# Patient Record
Sex: FEMALE | Race: OTHER
[De-identification: names, ages, dates, MRNs, and addresses within clinical notes are randomized per-mention and may not be internally consistent; named-entity substitution may affect disease eponyms.]

---

## 2017-02-28 ENCOUNTER — HOSPITAL ENCOUNTER (EMERGENCY)
Dept: HOSPITAL 10 - E/R | Age: 82
LOS: 1 days | Discharge: HOME | End: 2017-03-01
Payer: MEDICARE

## 2017-02-28 VITALS
BODY MASS INDEX: 20.97 KG/M2 | WEIGHT: 125.88 LBS | WEIGHT: 125.88 LBS | BODY MASS INDEX: 20.97 KG/M2 | HEIGHT: 65 IN | HEIGHT: 65 IN

## 2017-02-28 DIAGNOSIS — J45.901: Primary | ICD-10-CM

## 2017-02-28 LAB
ADD SCAN DIFF: NO
ADD UMIC: YES
ALBUMIN SERPL-MCNC: 4.1 G/DL (ref 3.3–4.9)
ALBUMIN/GLOB SERPL: 0.95 {RATIO}
ALP SERPL-CCNC: 119 IU/L (ref 42–121)
ALT SERPL-CCNC: 24 IU/L (ref 13–69)
ANION GAP SERPL CALC-SCNC: 16 MMOL/L (ref 8–16)
AST SERPL-CCNC: 32 IU/L (ref 15–46)
BASOPHILS # BLD AUTO: 0.1 10^3/UL (ref 0–0.1)
BASOPHILS NFR BLD: 0.7 % (ref 0–2)
BILIRUB DIRECT SERPL-MCNC: 0 MG/DL (ref 0–0.2)
BILIRUB SERPL-MCNC: 0.1 MG/DL (ref 0.2–1.3)
BUN SERPL-MCNC: 13 MG/DL (ref 7–20)
CALCIUM SERPL-MCNC: 9.5 MG/DL (ref 8.4–10.2)
CHLORIDE SERPL-SCNC: 103 MMOL/L (ref 97–110)
CO2 SERPL-SCNC: 29 MMOL/L (ref 21–31)
COLOR UR: (no result)
CREAT SERPL-MCNC: 0.62 MG/DL (ref 0.44–1)
EOSINOPHIL # BLD: 0.2 10^3/UL (ref 0–0.5)
EOSINOPHIL NFR BLD: 1.8 % (ref 0–7)
ERYTHROCYTE [DISTWIDTH] IN BLOOD BY AUTOMATED COUNT: 12.5 % (ref 11.5–14.5)
GLOBULIN SER-MCNC: 4.3 G/DL (ref 1.3–3.2)
GLUCOSE SERPL-MCNC: 92 MG/DL (ref 70–220)
GLUCOSE UR STRIP-MCNC: NEGATIVE %
HCT VFR BLD CALC: 40.8 % (ref 37–47)
HGB BLD-MCNC: 13.6 G/DL (ref 12–16)
KETONES UR STRIP.AUTO-MCNC: NEGATIVE MG/DL
LYMPHOCYTES # BLD AUTO: 2.5 10^3/UL (ref 0.8–2.9)
LYMPHOCYTES NFR BLD AUTO: 28.7 % (ref 15–51)
MCH RBC QN AUTO: 30.8 PG (ref 29–33)
MCHC RBC AUTO-ENTMCNC: 33.3 G/DL (ref 32–37)
MCV RBC AUTO: 92.5 FL (ref 82–101)
MONOCYTES # BLD: 0.8 10^3/UL (ref 0.3–0.9)
MONOCYTES NFR BLD: 8.5 % (ref 0–11)
NEUTROPHILS # BLD: 5.3 10^3/UL (ref 1.6–7.5)
NEUTROPHILS NFR BLD AUTO: 59.8 % (ref 39–77)
NITRITE UR QL STRIP.AUTO: NEGATIVE
NRBC # BLD MANUAL: 0 10^3/UL (ref 0–0)
NRBC BLD QL: 0 /100WBC (ref 0–0)
PLATELET # BLD: 231 10^3/UL (ref 140–415)
PMV BLD AUTO: 10.4 FL (ref 7.4–10.4)
POTASSIUM SERPL-SCNC: 4.1 MMOL/L (ref 3.5–5.1)
PROT SERPL-MCNC: 8.4 G/DL (ref 6.1–8.1)
RBC # BLD AUTO: 4.41 10^6/UL (ref 4.2–5.4)
RBC # UR AUTO: NEGATIVE /UL
RBC #/AREA URNS HPF: (no result) /HPF
SODIUM SERPL-SCNC: 144 MMOL/L (ref 135–144)
SQUAMOUS #/AREA URNS HPF: (no result) /[HPF]
URINE BILIRUBIN (DIP): NEGATIVE
URINE TOTAL PROTEIN (DIP): NEGATIVE
UROBILINOGEN UR STRIP-ACNC: (no result) (ref 0.1–1)
WBC # BLD AUTO: 8.8 10^3/UL (ref 4.8–10.8)
WBC # UR STRIP: (no result) /UL

## 2017-02-28 PROCEDURE — 96374 THER/PROPH/DIAG INJ IV PUSH: CPT

## 2017-02-28 PROCEDURE — 85025 COMPLETE CBC W/AUTO DIFF WBC: CPT

## 2017-02-28 PROCEDURE — 71010: CPT

## 2017-02-28 PROCEDURE — 81001 URINALYSIS AUTO W/SCOPE: CPT

## 2017-02-28 PROCEDURE — 96375 TX/PRO/DX INJ NEW DRUG ADDON: CPT

## 2017-02-28 PROCEDURE — 80053 COMPREHEN METABOLIC PANEL: CPT

## 2017-02-28 PROCEDURE — 99284 EMERGENCY DEPT VISIT MOD MDM: CPT

## 2017-02-28 PROCEDURE — 94664 DEMO&/EVAL PT USE INHALER: CPT

## 2017-02-28 PROCEDURE — 81003 URINALYSIS AUTO W/O SCOPE: CPT

## 2017-02-28 NOTE — RADRPT
PROCEDURE:   XR Chest. 

 

CLINICAL INDICATION:   Cough.   

 

TECHNIQUE:   Portable AP upright view of the chest was obtained. 

 

COMPARISON:   03/12/2016 

 

FINDINGS:

 

The cardiomediastinal silhouette is within upper normal limits.  Calcifications of the right lower h
emithorax are again noted possibly chronic granulomatous disease plaques, no acute infiltrate is pre
sent.  There is no evidence for pleural effusion, pneumothorax or pulmonary vascular congestion.  De
mineralization is again noted without evidence of acute osseous abnormality. Calcification is again 
visible within the aortic arch. 

 

RPTAT:HJJR

 

IMPRESSION:

 

1.  Stable calcifications of the right lower lobe region possibly chronic granulomatous disease or c
alcified pleural plaques.

2.  No evidence of pneumonia or new intrathoracic abnormality, the appearance of the chest unchanged
 from 03/12/2016 when allowing for technical differences.

3.  Aortic atherosclerosis is present.

_____________________________________________ 

Physician Deven           Date    Time 

Electronically viewed and signed by Physician Deven on 02/28/2017 22:59 

 

D:  02/28/2017 22:59  T:  02/28/2017 22:59

JR/

## 2017-03-01 VITALS — RESPIRATION RATE: 16 BRPM | SYSTOLIC BLOOD PRESSURE: 149 MMHG | DIASTOLIC BLOOD PRESSURE: 73 MMHG | HEART RATE: 81 BPM

## 2017-06-03 ENCOUNTER — HOSPITAL ENCOUNTER (EMERGENCY)
Dept: HOSPITAL 10 - FTE | Age: 82
Discharge: HOME | End: 2017-06-03
Payer: MEDICARE

## 2017-06-03 VITALS
WEIGHT: 126.77 LBS | BODY MASS INDEX: 23.33 KG/M2 | BODY MASS INDEX: 23.33 KG/M2 | WEIGHT: 126.77 LBS | HEIGHT: 62 IN | HEIGHT: 62 IN

## 2017-06-03 DIAGNOSIS — R05: Primary | ICD-10-CM

## 2017-06-03 PROCEDURE — 71010: CPT

## 2017-06-03 NOTE — ERD
ER Documentation


Chief Complaint


Date/Time


DATE: 6/3/17 


TIME: 19:10


Chief Complaint


cough





HPI


This 81-year-old female presents with cough for the last 2 weeks.  Her doctor 

did a PPD by history may have had a positive.  Chest x-ray result is pending.  

She is here because she complains of a cough with slight production.  She 

denies hemoptysis night sweats, chest pain, additional symptoms.





ROS


All systems reviewed and are negative except as per history of present illness.





Medications


Home Meds


Active Scripts


Acetaminophen* (Tylophen*) 500 Mg Capsule, 1 CAP PO Q6H Y for PAIN AND OR 

ELEVATED TEMP, #15 CAP


   Prov:SHERRILL OLIVEROS MD         6/3/17


Azithromycin* (Zithromax*) 250 Mg Tablet, 250 MG PO .ZPACK AS DIRECTED, #6 TAB


   TAKE 500 MG (2 TABS) THE FIRST DAY THEN 250 MG (1 TAB) DAYS 2-5


   Prov:SHERRILL OLIVEROS MD         6/3/17


Dextromethorphan Hb-Promethazine Hcl (Promethazine DM Syrup) 473 Ml Syrup, 5 ML 

PO Q6H Y for COUGH, #4 OZ


   Prov:SHERRILL OLIVEROS MD         6/3/17


Azithromycin* (Zithromax*) 250 Mg Tablet, 250 MG PO .ZPACK AS DIRECTED, #6 TAB


   TAKE 500 MG (2 TABS) THE FIRST DAY THEN 250 MG (1 TAB) DAYS 2-5


   Prov:ALESSIA GARCIA         3/1/17


Albuterol Sulfate* (Ventolin HFA*) 18 Gm Hfa.aer.ad, 2 PUFF INHALATION Q4H, #1 

INHALER


   Prov:ALESSIA GARCIA         3/1/17


Prednisone* (Prednisone*) 20 Mg Tab, 40 MG PO DAILY for 4 Days, TAB


   Prov:ALESSIA GARCIA         3/1/17





Allergies


Allergies:  


Coded Allergies:  


     No Known Allergy (Unverified , 2/28/17)





PMhx/Soc


Medical and Surgical Hx:  pt denies Medical Hx, pt denies Surgical Hx


History of Surgery:  No


Anesthesia Reaction:  No


Hx Neurological Disorder:  No


Hx Respiratory Disorders:  No


Hx Cardiac Disorders:  No


Hx Psychiatric Problems:  No


Hx Miscellaneous Medical Probl:  No


Hx Alcohol Use:  No


Hx Substance Use:  No


Hx Tobacco Use:  No


Smoking Status:  Never smoker





Physical Exam


Vitals





Vital Signs








  Date Time  Temp Pulse Resp B/P Pulse Ox O2 Delivery O2 Flow Rate FiO2


 


6/3/17 16:26 99.7 83 18 121/66 96   








Physical Exam


Const:  [] Alert, not ill-appearing.


Head:   Atraumatic 


Eyes:    Normal Conjunctiva


ENT:    Normal External Ears, Nose and Mouth.


Neck:               Full range of motion..~ No meningismus.


Resp:    Clear to auscultation bilaterally


Cardio:    Regular rate and rhythm, no murmurs


Abd:    Soft, non tender, non distended. Normal bowel sounds


Skin:    No petechiae or rashes


Back:    No midline or flank tenderness


Ext:    No cyanosis, or edema


Neur:    Awake and alert


Psych:    Normal Mood and Affect





Procedures/MDM


Chest X-ray 1V Interpreted by me:


Soft Tissue:                                               No acute 

abnormalities


Bones:                                                    No acute abnormalities


Mediastinum/Cardiac Silhouette/Lungs:     [No acute abnormalities].  Impression-

normal 1 view chest x-ray





Patient presents with a cough with x-ray with no signs of infiltrate, signs of 

hypoxemia, respiratory distress or active tuberculosis.  Patient was treated 

with Zithromax, promethazine and further observation at home.  The patient was 

stable with no new complaints during the ER course. Clinically, there is no 

current evidence to suggest meningitis, sepsis, acute abdomen, pneumonia, acute 

coronary syndrome, pulmonary embolism, or any other emergent condition 

appearing to require further evaluation or hospitalization. The patient should 

certainly return for any new or worsening symptoms per the aftercare 

instructions. They should otherwise follow-up with her primary care doctor for 

reevaluation this week.





Departure


Diagnosis:  


 Primary Impression:  


 Cough


Condition:  Stable


Patient Instructions:  Acute Bronchitis





Additional Instructions:  


X-ray normal.   Cheque otro vez con tucker doctor primario en el proximo ambriz or 

regresa para mas o nueva simptomas.











SHERRILL OLIVEROS MD Dorian 3, 2017 19:11

## 2017-06-03 NOTE — RADRPT
PROCEDURE:   XR Chest. 

 

CLINICAL INDICATION:   Cough.   

 

TECHNIQUE:   Portable AP upright view of the chest was obtained. 

 

COMPARISON:   02/28/2017 

 

FINDINGS:

 

The cardiomediastinal silhouette is within normal limits.  The lungs are clear of acute infiltrates.
  Basilar calcifications in the right lower lobe are less conspicuous than previously.  There is mannie
e flattening of the diaphragm which cannot exclude emphysema.  There is no evidence for pleural effu
ingrid, pneumothorax or pulmonary vascular congestion.  The osseous structures are intact with no evid
ence for acute abnormality. Calcification of the aorta is demonstrated. 

 

RPTAT:HJJR

 

IMPRESSION:

 

1.  Mild flattening of the diaphragm unable to exclude emphysema.

2.  Right lower lobe calcifications less conspicuous on the prior study possibly chronic granulomato
us disease or pleural plaques.

3.  No evidence of pulmonary infiltrate.

_____________________________________________ 

Physician Deven           Date    Time 

Electronically viewed and signed by Physician Deven on 06/03/2017 20:18 

 

D:  06/03/2017 20:18  T:  06/03/2017 20:18

/

## 2017-07-30 ENCOUNTER — HOSPITAL ENCOUNTER (EMERGENCY)
Dept: HOSPITAL 10 - E/R | Age: 82
Discharge: HOME | End: 2017-07-30
Payer: MEDICARE

## 2017-07-30 VITALS
WEIGHT: 173.94 LBS | HEIGHT: 63 IN | WEIGHT: 173.94 LBS | BODY MASS INDEX: 30.82 KG/M2 | HEIGHT: 63 IN | BODY MASS INDEX: 30.82 KG/M2

## 2017-07-30 VITALS — HEART RATE: 79 BPM | SYSTOLIC BLOOD PRESSURE: 116 MMHG | DIASTOLIC BLOOD PRESSURE: 78 MMHG | RESPIRATION RATE: 18 BRPM

## 2017-07-30 VITALS — TEMPERATURE: 98.3 F

## 2017-07-30 DIAGNOSIS — R10.84: ICD-10-CM

## 2017-07-30 DIAGNOSIS — R19.7: Primary | ICD-10-CM

## 2017-07-30 LAB
ADD UMIC: YES
ALBUMIN SERPL-MCNC: 4.5 G/DL (ref 3.3–4.9)
ALBUMIN/GLOB SERPL: 1.28 {RATIO}
ALP SERPL-CCNC: 79 IU/L (ref 42–121)
ALT SERPL-CCNC: 26 IU/L (ref 13–69)
AMYLASE SERPL-CCNC: 79 U/L (ref 11–123)
ANION GAP SERPL CALC-SCNC: 19 MMOL/L (ref 8–16)
APTT BLD: 29.3 SEC (ref 25–35)
AST SERPL-CCNC: 25 IU/L (ref 15–46)
BASOPHILS # BLD AUTO: 0 10^3/UL (ref 0–0.1)
BASOPHILS NFR BLD: 0.4 % (ref 0–2)
BILIRUB DIRECT SERPL-MCNC: 0 MG/DL (ref 0–0.2)
BILIRUB SERPL-MCNC: 0.2 MG/DL (ref 0.2–1.3)
BUN SERPL-MCNC: 11 MG/DL (ref 7–20)
CALCIUM SERPL-MCNC: 9.6 MG/DL (ref 8.4–10.2)
CHLORIDE SERPL-SCNC: 101 MMOL/L (ref 97–110)
CO2 SERPL-SCNC: 26 MMOL/L (ref 21–31)
COLOR UR: (no result)
CREAT SERPL-MCNC: 0.77 MG/DL (ref 0.44–1)
EOSINOPHIL # BLD: 0 10^3/UL (ref 0–0.5)
EOSINOPHIL NFR BLD: 0.1 % (ref 0–7)
ERYTHROCYTE [DISTWIDTH] IN BLOOD BY AUTOMATED COUNT: 12.5 % (ref 11.5–14.5)
GLOBULIN SER-MCNC: 3.5 G/DL (ref 1.3–3.2)
GLUCOSE SERPL-MCNC: 101 MG/DL (ref 70–220)
GLUCOSE UR STRIP-MCNC: NEGATIVE MG/DL
HCT VFR BLD CALC: 40.5 % (ref 37–47)
HGB BLD-MCNC: 14 G/DL (ref 12–16)
INR PPP: 0.93
KETONES UR STRIP.AUTO-MCNC: NEGATIVE MG/DL
LYMPHOCYTES # BLD AUTO: 1.7 10^3/UL (ref 0.8–2.9)
LYMPHOCYTES NFR BLD AUTO: 16.2 % (ref 15–51)
MCH RBC QN AUTO: 31.3 PG (ref 29–33)
MCHC RBC AUTO-ENTMCNC: 34.6 G/DL (ref 32–37)
MCV RBC AUTO: 90.4 FL (ref 82–101)
MONOCYTES # BLD: 0.7 10^3/UL (ref 0.3–0.9)
MONOCYTES NFR BLD: 6.9 % (ref 0–11)
NEUTROPHILS # BLD: 8 10^3/UL (ref 1.6–7.5)
NEUTROPHILS NFR BLD AUTO: 75.9 % (ref 39–77)
NITRITE UR QL STRIP.AUTO: NEGATIVE MG/DL
NRBC # BLD MANUAL: 0 10^3/UL (ref 0–0)
NRBC BLD AUTO-RTO: 0 /100WBC (ref 0–0)
PLATELET # BLD: 200 10^3/UL (ref 140–415)
PMV BLD AUTO: 10.7 FL (ref 7.4–10.4)
POTASSIUM SERPL-SCNC: 4 MMOL/L (ref 3.5–5.1)
PROT SERPL-MCNC: 8 G/DL (ref 6.1–8.1)
PROTHROMBIN TIME: 12.5 SEC (ref 12.2–14.2)
PT RATIO: 1
RBC # BLD AUTO: 4.48 10^6/UL (ref 4.2–5.4)
RBC # UR AUTO: (no result) MG/DL
SODIUM SERPL-SCNC: 142 MMOL/L (ref 135–144)
TROPONIN I SERPL-MCNC: < 0.012 NG/ML (ref 0–0.12)
UR ASCORBIC ACID: NEGATIVE MG/DL
UR BILIRUBIN (DIP): NEGATIVE MG/DL
UR CLARITY: CLEAR
UR PH (DIP): 5 (ref 5–9)
UR RBC: 0 /HPF (ref 0–5)
UR SPECIFIC GRAVITY (DIP): 1.02 (ref 1–1.03)
UR TOTAL PROTEIN (DIP): NEGATIVE MG/DL
UROBILINOGEN UR STRIP-ACNC: NEGATIVE MG/DL
WBC # BLD AUTO: 10.5 10^3/UL (ref 4.8–10.8)
WBC # UR STRIP: (no result) LEU/UL

## 2017-07-30 PROCEDURE — 74177 CT ABD & PELVIS W/CONTRAST: CPT

## 2017-07-30 PROCEDURE — 84484 ASSAY OF TROPONIN QUANT: CPT

## 2017-07-30 PROCEDURE — 87086 URINE CULTURE/COLONY COUNT: CPT

## 2017-07-30 PROCEDURE — 82150 ASSAY OF AMYLASE: CPT

## 2017-07-30 PROCEDURE — 93005 ELECTROCARDIOGRAM TRACING: CPT

## 2017-07-30 PROCEDURE — 80053 COMPREHEN METABOLIC PANEL: CPT

## 2017-07-30 PROCEDURE — 81001 URINALYSIS AUTO W/SCOPE: CPT

## 2017-07-30 PROCEDURE — 85610 PROTHROMBIN TIME: CPT

## 2017-07-30 PROCEDURE — 99285 EMERGENCY DEPT VISIT HI MDM: CPT

## 2017-07-30 PROCEDURE — 36415 COLL VENOUS BLD VENIPUNCTURE: CPT

## 2017-07-30 PROCEDURE — 83690 ASSAY OF LIPASE: CPT

## 2017-07-30 PROCEDURE — 85025 COMPLETE CBC W/AUTO DIFF WBC: CPT

## 2017-07-30 PROCEDURE — 85730 THROMBOPLASTIN TIME PARTIAL: CPT

## 2017-07-30 PROCEDURE — 96374 THER/PROPH/DIAG INJ IV PUSH: CPT

## 2017-07-30 PROCEDURE — 71010: CPT

## 2017-07-30 PROCEDURE — 96361 HYDRATE IV INFUSION ADD-ON: CPT

## 2017-07-30 NOTE — RADRPT
PROCEDURE:   XR Chest. 

 

CLINICAL INDICATION:   Shortness of breath.

 

TECHNIQUE:   A single portable view of the chest was obtained. 

 

COMPARISON:   06/03/2017 

 

FINDINGS:

The aorta is tortuous and atherosclerotic.  The cardiomediastinal silhouette is otherwise within nor
mal limits.  The lungs and pleural spaces are clear.  The soft tissues and osseous structures demons
trate benign age related senescent changes. 

 

IMPRESSION:

No acute cardiopulmonary disease. 

 

 

RPTAT: HPNM

_____________________________________________ 

Physician Everette           Date    Time 

Electronically viewed and signed by Chad Mora Physician on 07/30/2017 12:49 

 

D:  07/30/2017 12:49  T:  07/30/2017 12:49

PM/

## 2017-07-30 NOTE — RADRPT
PROCEDURE:   CT abdomen and pelvis with intravenous contrast. 

 

CLINICAL INDICATION:  Abdominal Pain fever.  Diarrhea.

 

TECHNIQUE:   Following intravenous contrast, spiral CT of the abdomen pelvis was performed and is re
constructed at 2.5 mm contiguous axial intervals from the dome of the diaphragm to the inferior pubi
c rami. Computer reformatted coronal and sagittal images are included.

 

CT D I 7 millicurie  

 

Dose the 352 millicurie per centimeter

 

 

COMPARISON:   None. 

 

FINDINGS:

 

Lung bases are clear of any infiltrate or mass.  There is no effusion.

 

The liver is of normal size, contour and attenuation with no solid mass or intrahepatic ductal dilat
ation. There is a 5 mm cyst in the right lobe of the liver. No gallstones are present.

 

No splenic, adrenal or pancreatic abnormalities present.

 

Kidneys excrete contrast symmetrically.  No hydronephrosis, calculus or solid masses present. 1 cm p
arenchymal cyst is seen in the lower pole of the right kidney.  Ureters are of normal course and keturah
iber with no stone.  No bladder mass or stone is present.

 

Uterus and ovaries are normal.

 

No bowel mass or obstruction is seen.  The appendix is normal.  There is no phlegmon or pneumoperito
neum. There is a small to moderate volume of pelvic ascites.

 

No aneurysm is detected. There are dense vascular calcifications.

 

There is no adenopathy.

 

The osseous structures are intact.

 

IMPRESSION:

No evidence of urolithiasis, obstructive uropathy, diverticulitis or appendicitis.

 

Small to moderate volume pelvic ascites.  This is of unknown etiology.

 

Hepatic cyst.

 

Right renal cyst.

 

Vascular calcifications.

_____________________________________________ 

.Nixon Cedeño MD, MD           Date    Time 

Electronically viewed and signed by .Nixon Cedeño MD, MD on 07/30/2017 14:38 

 

D:  07/30/2017 14:38  T:  07/30/2017 14:38

.A/

## 2017-07-30 NOTE — ERD
ER Documentation


Chief Complaint


Date/Time


DATE: 7/30/17 


TIME: 14:37


Chief Complaint


REPORTED FEVER  LAST NIGHT, TODAY ALSO DIARRHEA





HPI


This is a very pleasant 82-year-old female, [place]ic-speaking, that presents to 

the emergency department complaining of generalized abdominal cramping, loose 

watery stools and a tactile fever that began  roughly 12 hours prior to 

arrival.  The patient is not currently on antibiotics.  She did take Tylenol at 

the onset of the fever which was 12 hours prior to arrival.  Patient not had 

any recent travel or hospitalizations.  The patient denies any chest pain or 

pressure that radiates the neck arm back or jaw.  Patient has not had any 

frequency urgency or dysuria.  She denies any myalgias or weakness.  She denies 

a headache or changes in vision.  She has no shortness of breath at rest or 

exertion.  She indicates she has roughly 2 episodes of loose watery stools 

yesterday evening and once just prior to arrival.  She denies any hemoptysis 

hematemesis or melanotic stools





ROS


All systems reviewed and are negative except as per history of present illness.





Medications


Home Meds


Active Scripts


Ciprofloxacin Hcl* (Ciprofloxacin Hcl*) 500 Mg Tablet, 500 MG PO BID for 7 Days

, TAB


   Prov:KEIKO BOYD         7/30/17


Acetaminophen* (Tylophen*) 500 Mg Capsule, 1 CAP PO Q6H Y for PAIN AND OR 

ELEVATED TEMP, #15 CAP


   Prov:SHERRILL OLIVEROS MD         6/3/17


Discontinued Scripts


Azithromycin* (Zithromax*) 250 Mg Tablet, 250 MG PO .ZPACK AS DIRECTED, #6 TAB


   TAKE 500 MG (2 TABS) THE FIRST DAY THEN 250 MG (1 TAB) DAYS 2-5


   Prov:SHERRILL OLIVEROS MD         6/3/17


Dextromethorphan Hb-Promethazine Hcl (Promethazine DM Syrup) 473 Ml Syrup, 5 ML 

PO Q6H Y for COUGH, #4 OZ


   Prov:SHERRILL OLIVEROS MD         6/3/17


Azithromycin* (Zithromax*) 250 Mg Tablet, 250 MG PO .ZPACK AS DIRECTED, #6 TAB


   TAKE 500 MG (2 TABS) THE FIRST DAY THEN 250 MG (1 TAB) DAYS 2-5


   Prov:ALESSIA GARCIA         3/1/17


Albuterol Sulfate* (Ventolin HFA*) 18 Gm Hfa.aer.ad, 2 PUFF INHALATION Q4H, #1 

INHALER


   Prov:ALESSIA GARICA         3/1/17


Prednisone* (Prednisone*) 20 Mg Tab, 40 MG PO DAILY for 4 Days, TAB


   Prov:ALESSIA GARCIA         3/1/17





Allergies


Allergies:  


Coded Allergies:  


     No Known Allergy (Unverified , 7/30/17)





PMhx/Soc


History of Surgery:  No


Anesthesia Reaction:  No


Hx Neurological Disorder:  No


Hx Respiratory Disorders:  No


Hx Cardiac Disorders:  No


Hx Psychiatric Problems:  No


Hx Miscellaneous Medical Probl:  No


Hx Alcohol Use:  No


Hx Substance Use:  No


Hx Tobacco Use:  No


Smoking Status:  Never smoker





Physical Exam


Vitals





Vital Signs








  Date Time  Temp Pulse Resp B/P Pulse Ox O2 Delivery O2 Flow Rate FiO2


 


7/30/17 13:20 98.3 75 17 117/73 96 Room Air  


 


7/30/17 11:11 100.7 90 18 114/53 96   








Physical Exam


Constitutional:Well-developed. Well-nourished.


HEENT:Normocephalic. Atraumatic.Pupils were equal round reactive to light. Dry 

mucous membranes.No tonsillar exudates.


Neck: No nuchal rigidity. No lymphadenopathy. No posterior cervical spine 

tenderness or step-offs.


Respiratory: Not using accessory muscles of respiration.Lungs were clear to 

auscultation bilaterally. No rhonchi. No rales. No wheezing. 


Cardiovascular: Regular rate regular rhythm.No murmurs. No rubs were 

appreciated.S1, S2 normal. Distal pulses are palpable 2+ bilaterally.


GI: Abdomen was soft.  Tenderness in the left lower quadrant. Non Distended. No 

pulsatile abdominal masses or bruits. No rebound. No guarding. Bowel sounds 

were present and normal. 


Muscle skeletal: Full range of motion of both the upper and lower extremities 

bilaterally.Normal muscle tone.No assymetrical calf tenderness or swelling. 


Skin: No petechia, no purpura. No lesions on the palms or the soles of the 

feet. No maculopapular rash.


NEURO: Patient was alert, awake, orientated x3.No facial droop. Gait observed 

and normal with no ataxia.Speech had regular rate and rhythm. No focal 

neurological deficits.


Result Diagram:  


7/30/17 1225                                                                   

             7/30/17 1225





Results 24 hrs





 Laboratory Tests








Test


  7/30/17


12:25 7/30/17


14:30


 


White Blood Count 10.510^3/ul  


 


Red Blood Count 4.4810^6/ul  


 


Hemoglobin 14.0g/dl  


 


Hematocrit 40.5%  


 


Mean Corpuscular Volume 90.4fl  


 


Mean Corpuscular Hemoglobin 31.3pg  


 


Mean Corpuscular Hemoglobin


Concent 34.6g/dl 


  


 


 


Red Cell Distribution Width 12.5%  


 


Platelet Count 68549^3/UL  


 


Mean Platelet Volume 10.7fl  


 


Neutrophils % 75.9%  


 


Lymphocytes % 16.2%  


 


Monocytes % 6.9%  


 


Eosinophils % 0.1%  


 


Basophils % 0.4%  


 


Nucleated Red Blood Cells % 0.0/100WBC  


 


Neutrophils # 8.010^3/ul  


 


Lymphocytes # 1.710^3/ul  


 


Monocytes # 0.710^3/ul  


 


Eosinophils # 0.010^3/ul  


 


Basophils # 0.010^3/ul  


 


Nucleated Red Blood Cells # 0.010^3/ul  


 


Prothrombin Time 12.5Sec  


 


Prothrombin Time Ratio 1.0  


 


INR International Normalized


Ratio 0.93 


  


 


 


Activated Partial


Thromboplast Time 29.3Sec 


  


 


 


Sodium Level 142mmol/L  


 


Potassium Level 4.0mmol/L  


 


Chloride Level 101mmol/L  


 


Carbon Dioxide Level 26mmol/L  


 


Anion Gap 19  


 


Blood Urea Nitrogen 11mg/dl  


 


Creatinine 0.77mg/dl  


 


Glucose Level 101mg/dl  


 


Calcium Level 9.6mg/dl  


 


Total Bilirubin 0.2mg/dl  


 


Direct Bilirubin 0.00mg/dl  


 


Indirect Bilirubin 0.2mg/dl  


 


Aspartate Amino Transf


(AST/SGOT) 25IU/L 


  


 


 


Alanine Aminotransferase


(ALT/SGPT) 26IU/L 


  


 


 


Alkaline Phosphatase 79IU/L  


 


Troponin I < 0.012ng/ml  


 


Total Protein 8.0g/dl  


 


Albumin 4.5g/dl  


 


Globulin 3.50g/dl  


 


Albumin/Globulin Ratio 1.28  


 


Amylase Level 79U/L  


 


Lipase 113U/L  


 


Urine Color  STRAW 


 


Urine Clarity  CLEAR 


 


Urine pH  5.0 


 


Urine Specific Gravity  1.019 


 


Urine Ketones  NEGATIVEmg/dL 


 


Urine Nitrite  NEGATIVEmg/dL 


 


Urine Bilirubin  NEGATIVEmg/dL 


 


Urine Urobilinogen  NEGATIVEmg/dL 


 


Urine Leukocyte Esterase  TRACELeu/ul 


 


Urine Microscopic RBC  0/HPF 


 


Urine Microscopic WBC  2/HPF 


 


Urine Hemoglobin  1+mg/dL 


 


Urine Glucose  NEGATIVEmg/dL 


 


Urine Total Protein  NEGATIVEmg/dl 








 Current Medications








 Medications


  (Trade)  Dose


 Ordered  Sig/Jassi


 Route


 PRN Reason  Start Time


 Stop Time Status Last Admin


Dose Admin


 


 Sodium Chloride


  (NS)  1,000 ml @ 


 1,000 mls/hr  Q1H STAT


 IV


   7/30/17 12:12


 7/30/17 13:11 DC 7/30/17 12:27


 


 


 Morphine Sulfate


  (morphine)  2 mg  ONCE  STAT


 IV


   7/30/17 12:12


 7/30/17 12:13 DC  


 


 


 Ondansetron HCl


  (Zofran Inj)  4 mg  ONCE  STAT


 IV


   7/30/17 12:12


 7/30/17 12:13 DC 7/30/17 12:27


 


 


 IV Flush 10 ml  10 ml  STK-MED ONCE


 .ROUTE


   7/30/17 14:12


 7/30/17 14:13 DC  


 


 


 Sodium Chloride


  (NS)  100 ml @ ud  STK-MED ONCE


 .ROUTE


   7/30/17 14:12


 7/30/17 14:13 DC  


 


 


 Iohexol


  (Omnipaque 300mg/


 ml)  150 ml  STK-MED ONCE


 .ROUTE


   7/30/17 14:12


 7/30/17 14:13 DC  


 











Procedures/MDM


This is a very pleasant 82-year-old female that had presented to the emergency 

department with diarrhea and physical exam findings of mild clinical 

dehydration.  This patient also presented to the emergency department with 

abdominal pain and was seen and evaluated by myself. My differential diagnosis 

included but was not limited to abdominal aortic aneurysm, appendicitis, 

pancreatitis, perforated peptic ulcer, perforated viscus, Boerhaave's syndrome 

or visceral pain such as diverticulitis, DKA, esophagitis, hepatitis or bowel 

obstruction.





The patient was placed on a cardiac monitor, continuous pulse oximetry, and IV 

access was established by nursing staff.  Patient received intravenous morphine 

and Zofran.  The patient arrived she did have a low-grade fever of 100.7 

however this was repeated and no antipyretics have been given and resolved.  

The patient received IV bolus of normal saline for the mild clinical 

dehydration.





The patient has no risk factors for Clostridium difficile.  I have however 

placed a C. difficile culture which is pending.





12 Lead EKG tracing ordered and reviewed by myself showed: 


Normal sinus rhythm of 73 bpm and no arrhythmia.


MN interval normal.


QRS duration normal.


No ST segment elevation


No ST segment depression. No changes consistent with acute ischemia. 





Observation Note:


Time:   4 hours


Family Hx:   No Hypertension


Evaluation:   Multiple exams showed improving symptoms and no evidence of 

peritoneal signs or severe dehydration.  The patient received a liter bolus of 

normal saline.  I did explain to the patient and her daughter was at bedside 

that the diarrhea likely was a viral etiology.  They were very adamant though 

that they would prefer a prescription for antibiotics and given that the 

patient had mild leukocytes but no pyuria and possible early cystitis she was 

sent with a prescription of ciprofloxacin.





The patient was discharged home in fair condition. They were instructed to 

return to the emergency department at any time if there was any worsening of 

their condition. The patient stated they would follow up with their PCP in the 

next 24-48 hours to initiate a suitable medication regimen under the care of 

their PCP as well as to allow their PCP to monitor any drug reactions. The 

patient was discharged home with prescriptions after they gave informed consent 

to the new medication.  They were also fully informed by myself on the adverse 

effects and adverse drug interactions in order to provide adequate safeguards 

to prevent possible adverse reactions to medications.





Departure


Diagnosis:  


 Primary Impression:  


 Diarrhea


 Diarrhea type:  unspecified type  Qualified Code:  R19.7 - Diarrhea, 

unspecified type


Condition:  KEIKO Ruiz Jul 30, 2017 14:43

## 2018-05-24 ENCOUNTER — HOSPITAL ENCOUNTER (EMERGENCY)
Dept: HOSPITAL 91 - E/R | Age: 83
Discharge: HOME | End: 2018-05-24
Payer: MEDICARE

## 2018-05-24 ENCOUNTER — HOSPITAL ENCOUNTER (EMERGENCY)
Age: 83
Discharge: HOME | End: 2018-05-24

## 2018-05-24 DIAGNOSIS — R10.9: Primary | ICD-10-CM

## 2018-05-24 LAB
ADD MAN DIFF?: NO
ADD UMIC: YES
ALANINE AMINOTRANSFERASE: 15 IU/L (ref 13–69)
ALBUMIN/GLOBULIN RATIO: 1.17
ALBUMIN: 4.1 G/DL (ref 3.3–4.9)
ALKALINE PHOSPHATASE: 106 IU/L (ref 42–121)
ANION GAP: 15 (ref 8–16)
ASPARTATE AMINO TRANSFERASE: 19 IU/L (ref 15–46)
BASOPHIL #: 0.1 10^3/UL (ref 0–0.1)
BASOPHILS %: 0.7 % (ref 0–2)
BILIRUBIN,DIRECT: 0 MG/DL (ref 0–0.2)
BILIRUBIN,TOTAL: 0.1 MG/DL (ref 0.2–1.3)
BLOOD UREA NITROGEN: 15 MG/DL (ref 7–20)
CALCIUM: 9.6 MG/DL (ref 8.4–10.2)
CARBON DIOXIDE: 30 MMOL/L (ref 21–31)
CHLORIDE: 106 MMOL/L (ref 97–110)
CREATININE: 0.74 MG/DL (ref 0.44–1)
EOSINOPHILS #: 0.1 10^3/UL (ref 0–0.5)
EOSINOPHILS %: 1.3 % (ref 0–7)
GLOBULIN: 3.5 G/DL (ref 1.3–3.2)
GLUCOSE: 101 MG/DL (ref 70–220)
HEMATOCRIT: 43.1 % (ref 37–47)
HEMOGLOBIN: 14.1 G/DL (ref 12–16)
INR: 0.9
LIPASE: 137 U/L (ref 23–300)
LYMPHOCYTES #: 2.2 10^3/UL (ref 0.8–2.9)
LYMPHOCYTES %: 19.9 % (ref 15–51)
MEAN CORPUSCULAR HEMOGLOBIN: 30.7 PG (ref 29–33)
MEAN CORPUSCULAR HGB CONC: 32.7 G/DL (ref 32–37)
MEAN CORPUSCULAR VOLUME: 93.7 FL (ref 82–101)
MEAN PLATELET VOLUME: 10.7 FL (ref 7.4–10.4)
MONOCYTE #: 1 10^3/UL (ref 0.3–0.9)
MONOCYTES %: 8.9 % (ref 0–11)
NEUTROPHIL #: 7.7 10^3/UL (ref 1.6–7.5)
NEUTROPHILS %: 68.8 % (ref 39–77)
NUCLEATED RED BLOOD CELLS #: 0 10^3/UL (ref 0–0)
NUCLEATED RED BLOOD CELLS%: 0 /100WBC (ref 0–0)
PARTIAL THROMBOPLASTIN TIME: 28.1 SEC (ref 25–35)
PLATELET COUNT: 213 10^3/UL (ref 140–415)
POTASSIUM: 4.4 MMOL/L (ref 3.5–5.1)
PROTIME: 12.2 SEC (ref 11.9–14.9)
PT RATIO: 1
RED BLOOD COUNT: 4.6 10^6/UL (ref 4.2–5.4)
RED CELL DISTRIBUTION WIDTH: 12.1 % (ref 11.5–14.5)
SODIUM: 147 MMOL/L (ref 135–144)
TOTAL PROTEIN: 7.6 G/DL (ref 6.1–8.1)
UR ASCORBIC ACID: NEGATIVE MG/DL
UR BACTERIA: (no result) /HPF
UR BILIRUBIN (DIP): NEGATIVE MG/DL
UR BLOOD (DIP): (no result) MG/DL
UR CLARITY: CLEAR
UR COLOR: YELLOW
UR GLUCOSE (DIP): NEGATIVE MG/DL
UR KETONES (DIP): NEGATIVE MG/DL
UR LEUKOCYTE ESTERASE (DIP): (no result) LEU/UL
UR NITRITE (DIP): NEGATIVE MG/DL
UR PH (DIP): 5 (ref 5–9)
UR RBC: 1 /HPF (ref 0–5)
UR SPECIFIC GRAVITY (DIP): 1.01 (ref 1–1.03)
UR SQUAMOUS EPITHELIAL CELL: (no result) /HPF
UR TOTAL PROTEIN (DIP): NEGATIVE MG/DL
UR UROBILINOGEN (DIP): NEGATIVE MG/DL
UR WBC: 16 /HPF (ref 0–5)
WHITE BLOOD COUNT: 11.1 10^3/UL (ref 4.8–10.8)

## 2018-05-24 PROCEDURE — 36415 COLL VENOUS BLD VENIPUNCTURE: CPT

## 2018-05-24 PROCEDURE — 81001 URINALYSIS AUTO W/SCOPE: CPT

## 2018-05-24 PROCEDURE — 99285 EMERGENCY DEPT VISIT HI MDM: CPT

## 2018-05-24 PROCEDURE — 96374 THER/PROPH/DIAG INJ IV PUSH: CPT

## 2018-05-24 PROCEDURE — 85610 PROTHROMBIN TIME: CPT

## 2018-05-24 PROCEDURE — 85025 COMPLETE CBC W/AUTO DIFF WBC: CPT

## 2018-05-24 PROCEDURE — 80053 COMPREHEN METABOLIC PANEL: CPT

## 2018-05-24 PROCEDURE — 83690 ASSAY OF LIPASE: CPT

## 2018-05-24 PROCEDURE — 96375 TX/PRO/DX INJ NEW DRUG ADDON: CPT

## 2018-05-24 PROCEDURE — 85730 THROMBOPLASTIN TIME PARTIAL: CPT

## 2018-05-24 PROCEDURE — 74176 CT ABD & PELVIS W/O CONTRAST: CPT

## 2018-05-24 RX ADMIN — ONDANSETRON HYDROCHLORIDE 1 MG: 2 INJECTION, SOLUTION INTRAMUSCULAR; INTRAVENOUS at 05:33

## 2018-12-21 ENCOUNTER — HOSPITAL ENCOUNTER (INPATIENT)
Age: 83
LOS: 3 days | Discharge: HOME | DRG: 871 | End: 2018-12-24

## 2018-12-21 ENCOUNTER — HOSPITAL ENCOUNTER (INPATIENT)
Dept: HOSPITAL 91 - E/R | Age: 83
LOS: 3 days | Discharge: HOME | DRG: 871 | End: 2018-12-24
Payer: MEDICARE

## 2018-12-21 DIAGNOSIS — J18.9: ICD-10-CM

## 2018-12-21 DIAGNOSIS — J44.0: ICD-10-CM

## 2018-12-21 DIAGNOSIS — J44.1: ICD-10-CM

## 2018-12-21 DIAGNOSIS — Z23: ICD-10-CM

## 2018-12-21 DIAGNOSIS — R91.8: ICD-10-CM

## 2018-12-21 DIAGNOSIS — J20.9: ICD-10-CM

## 2018-12-21 DIAGNOSIS — A41.9: Primary | ICD-10-CM

## 2018-12-21 LAB
ABNORMAL IP MESSAGE: 1
ADD MAN DIFF?: NO
ALANINE AMINOTRANSFERASE: 10 IU/L (ref 13–69)
ALBUMIN/GLOBULIN RATIO: 1.46
ALBUMIN: 4.4 G/DL (ref 3.3–4.9)
ALKALINE PHOSPHATASE: 71 IU/L (ref 42–121)
ANION GAP: 14 (ref 5–13)
ASPARTATE AMINO TRANSFERASE: 18 IU/L (ref 15–46)
BASOPHIL #: 0.1 10^3/UL (ref 0–0.1)
BASOPHILS %: 0.3 % (ref 0–2)
BILIRUBIN,DIRECT: 0 MG/DL (ref 0–0.2)
BILIRUBIN,TOTAL: 0.7 MG/DL (ref 0.2–1.3)
BLOOD UREA NITROGEN: 15 MG/DL (ref 7–20)
CALCIUM: 9.4 MG/DL (ref 8.4–10.2)
CARBON DIOXIDE: 26 MMOL/L (ref 21–31)
CHLORIDE: 100 MMOL/L (ref 97–110)
CREATININE: 0.65 MG/DL (ref 0.44–1)
EOSINOPHILS #: 0 10^3/UL (ref 0–0.5)
EOSINOPHILS %: 0 % (ref 0–7)
GLOBULIN: 3 G/DL (ref 1.3–3.2)
GLUCOSE: 131 MG/DL (ref 70–220)
HEMATOCRIT: 38.5 % (ref 37–47)
HEMOGLOBIN: 12.9 G/DL (ref 12–16)
IMMATURE GRANS #M: 0.09 10^3/UL (ref 0–0.03)
IMMATURE GRANS % (M): 0.5 % (ref 0–0.43)
INR: 1
LACTIC ACID: 1 MMOL/L (ref 0.5–2)
LYMPHOCYTES #: 2 10^3/UL (ref 0.8–2.9)
LYMPHOCYTES %: 10.6 % (ref 15–51)
MEAN CORPUSCULAR HEMOGLOBIN: 30.2 PG (ref 29–33)
MEAN CORPUSCULAR HGB CONC: 33.5 G/DL (ref 32–37)
MEAN CORPUSCULAR VOLUME: 90.2 FL (ref 82–101)
MEAN PLATELET VOLUME: 10.1 FL (ref 7.4–10.4)
MONOCYTE #: 1.6 10^3/UL (ref 0.3–0.9)
MONOCYTES %: 8.5 % (ref 0–11)
NEUTROPHIL #: 14.8 10^3/UL (ref 1.6–7.5)
NEUTROPHILS %: 80.1 % (ref 39–77)
NUCLEATED RED BLOOD CELLS #: 0 10^3/UL (ref 0–0)
NUCLEATED RED BLOOD CELLS%: 0 /100WBC (ref 0–0)
PARTIAL THROMBOPLASTIN TIME: 30.2 SEC (ref 23–35)
PLATELET COUNT: 205 10^3/UL (ref 140–415)
POSITIVE DIFF: (no result)
POTASSIUM: 3.8 MMOL/L (ref 3.5–5.1)
PROTIME: 13.3 SEC (ref 11.9–14.9)
PT RATIO: 1
RED BLOOD COUNT: 4.27 10^6/UL (ref 4.2–5.4)
RED CELL DISTRIBUTION WIDTH: 12.6 % (ref 11.5–14.5)
SODIUM: 140 MMOL/L (ref 135–144)
TOTAL PROTEIN: 7.4 G/DL (ref 6.1–8.1)
TROPONIN-I: < 0.012 NG/ML (ref 0–0.12)
URINE BLOOD (DIP) POC: (no result)
URINE PH (DIP) POC: 6.5 (ref 5–8.5)
URINE TOTAL PROTEIN POC: (no result)
WHITE BLOOD COUNT: 18.4 10^3/UL (ref 4.8–10.8)

## 2018-12-21 PROCEDURE — 96374 THER/PROPH/DIAG INJ IV PUSH: CPT

## 2018-12-21 PROCEDURE — 83605 ASSAY OF LACTIC ACID: CPT

## 2018-12-21 PROCEDURE — 80053 COMPREHEN METABOLIC PANEL: CPT

## 2018-12-21 PROCEDURE — 90686 IIV4 VACC NO PRSV 0.5 ML IM: CPT

## 2018-12-21 PROCEDURE — 99291 CRITICAL CARE FIRST HOUR: CPT

## 2018-12-21 PROCEDURE — 80048 BASIC METABOLIC PNL TOTAL CA: CPT

## 2018-12-21 PROCEDURE — 87040 BLOOD CULTURE FOR BACTERIA: CPT

## 2018-12-21 PROCEDURE — 93005 ELECTROCARDIOGRAM TRACING: CPT

## 2018-12-21 PROCEDURE — 36415 COLL VENOUS BLD VENIPUNCTURE: CPT

## 2018-12-21 PROCEDURE — 85610 PROTHROMBIN TIME: CPT

## 2018-12-21 PROCEDURE — 71045 X-RAY EXAM CHEST 1 VIEW: CPT

## 2018-12-21 PROCEDURE — 84100 ASSAY OF PHOSPHORUS: CPT

## 2018-12-21 PROCEDURE — 71260 CT THORAX DX C+: CPT

## 2018-12-21 PROCEDURE — 85730 THROMBOPLASTIN TIME PARTIAL: CPT

## 2018-12-21 PROCEDURE — 84484 ASSAY OF TROPONIN QUANT: CPT

## 2018-12-21 PROCEDURE — 87086 URINE CULTURE/COLONY COUNT: CPT

## 2018-12-21 PROCEDURE — 87400 INFLUENZA A/B EACH AG IA: CPT

## 2018-12-21 PROCEDURE — 83036 HEMOGLOBIN GLYCOSYLATED A1C: CPT

## 2018-12-21 PROCEDURE — 83735 ASSAY OF MAGNESIUM: CPT

## 2018-12-21 PROCEDURE — 81003 URINALYSIS AUTO W/O SCOPE: CPT

## 2018-12-21 PROCEDURE — 85025 COMPLETE CBC W/AUTO DIFF WBC: CPT

## 2018-12-21 RX ADMIN — SODIUM CHLORIDE 1 ML: 9 INJECTION, SOLUTION INTRAMUSCULAR; INTRAVENOUS; SUBCUTANEOUS at 17:25

## 2018-12-21 RX ADMIN — CEFEPIME HYDROCHLORIDE 1 MLS/HR: 2 INJECTION, POWDER, FOR SOLUTION INTRAVENOUS at 12:02

## 2018-12-21 RX ADMIN — ACETAMINOPHEN 1 MG: 325 TABLET, FILM COATED ORAL at 14:52

## 2018-12-21 RX ADMIN — THIAMINE HYDROCHLORIDE 1 MLS/HR: 100 INJECTION, SOLUTION INTRAMUSCULAR; INTRAVENOUS at 17:17

## 2018-12-21 RX ADMIN — AZITHROMYCIN MONOHYDRATE 1 MLS/HR: 500 INJECTION, POWDER, LYOPHILIZED, FOR SOLUTION INTRAVENOUS at 17:52

## 2018-12-21 RX ADMIN — VANCOMYCIN HYDROCHLORIDE 1 MLS/HR: 1 INJECTION, POWDER, LYOPHILIZED, FOR SOLUTION INTRAVENOUS at 13:19

## 2018-12-21 RX ADMIN — THIAMINE HYDROCHLORIDE 1 ML: 100 INJECTION, SOLUTION INTRAMUSCULAR; INTRAVENOUS at 12:02

## 2018-12-21 RX ADMIN — CEFTRIAXONE 1 MLS/HR: 1 INJECTION, SOLUTION INTRAVENOUS at 17:23

## 2018-12-21 RX ADMIN — VASOPRESSIN 1: 20 INJECTION, SOLUTION INTRAMUSCULAR; SUBCUTANEOUS at 17:25

## 2018-12-21 RX ADMIN — IOHEXOL 1 ML: 300 INJECTION, SOLUTION INTRAVENOUS at 17:24

## 2018-12-22 LAB
ADD MAN DIFF?: NO
ANION GAP: 9 (ref 5–13)
BASOPHIL #: 0 10^3/UL (ref 0–0.1)
BASOPHILS %: 0.4 % (ref 0–2)
BLOOD UREA NITROGEN: 12 MG/DL (ref 7–20)
CALCIUM: 8.7 MG/DL (ref 8.4–10.2)
CARBON DIOXIDE: 28 MMOL/L (ref 21–31)
CHLORIDE: 107 MMOL/L (ref 97–110)
CREATININE: 0.61 MG/DL (ref 0.44–1)
EOSINOPHILS #: 0.1 10^3/UL (ref 0–0.5)
EOSINOPHILS %: 1.3 % (ref 0–7)
GLUCOSE: 97 MG/DL (ref 70–220)
HEMATOCRIT: 36.3 % (ref 37–47)
HEMOGLOBIN A1C: 5.4 % (ref 0–5.9)
HEMOGLOBIN: 11.7 G/DL (ref 12–16)
IMMATURE GRANS #M: 0.05 10^3/UL (ref 0–0.03)
IMMATURE GRANS % (M): 0.5 % (ref 0–0.43)
LYMPHOCYTES #: 1.5 10^3/UL (ref 0.8–2.9)
LYMPHOCYTES %: 14.2 % (ref 15–51)
MAGNESIUM: 2.1 MG/DL (ref 1.7–2.5)
MEAN CORPUSCULAR HEMOGLOBIN: 30.6 PG (ref 29–33)
MEAN CORPUSCULAR HGB CONC: 32.2 G/DL (ref 32–37)
MEAN CORPUSCULAR VOLUME: 95 FL (ref 82–101)
MEAN PLATELET VOLUME: 10.3 FL (ref 7.4–10.4)
MONOCYTE #: 0.7 10^3/UL (ref 0.3–0.9)
MONOCYTES %: 6.8 % (ref 0–11)
NEUTROPHIL #: 8.1 10^3/UL (ref 1.6–7.5)
NEUTROPHILS %: 76.8 % (ref 39–77)
NUCLEATED RED BLOOD CELLS #: 0 10^3/UL (ref 0–0)
NUCLEATED RED BLOOD CELLS%: 0 /100WBC (ref 0–0)
PHOSPHORUS: 2.9 MG/DL (ref 2.5–4.9)
PLATELET COUNT: 180 10^3/UL (ref 140–415)
POTASSIUM: 3.6 MMOL/L (ref 3.5–5.1)
RED BLOOD COUNT: 3.82 10^6/UL (ref 4.2–5.4)
RED CELL DISTRIBUTION WIDTH: 12.7 % (ref 11.5–14.5)
SODIUM: 144 MMOL/L (ref 135–144)
WHITE BLOOD COUNT: 10.6 10^3/UL (ref 4.8–10.8)

## 2018-12-22 RX ADMIN — ENOXAPARIN SODIUM 1 MG: 100 INJECTION SUBCUTANEOUS at 08:11

## 2018-12-22 RX ADMIN — AZITHROMYCIN MONOHYDRATE 1 MLS/HR: 500 INJECTION, POWDER, LYOPHILIZED, FOR SOLUTION INTRAVENOUS at 17:28

## 2018-12-22 RX ADMIN — GUAIFENESIN 1 MG: 100 SOLUTION ORAL at 21:40

## 2018-12-22 RX ADMIN — THIAMINE HYDROCHLORIDE 1 MLS/HR: 100 INJECTION, SOLUTION INTRAMUSCULAR; INTRAVENOUS at 17:30

## 2018-12-22 RX ADMIN — CEFTRIAXONE 1 MLS/HR: 1 INJECTION, SOLUTION INTRAVENOUS at 16:09

## 2018-12-23 RX ADMIN — ENOXAPARIN SODIUM 1 MG: 100 INJECTION SUBCUTANEOUS at 08:40

## 2018-12-23 RX ADMIN — THIAMINE HYDROCHLORIDE 1 MLS/HR: 100 INJECTION, SOLUTION INTRAMUSCULAR; INTRAVENOUS at 16:40

## 2018-12-23 RX ADMIN — PROMETHAZINE HYDROCHLORIDE AND CODEINE PHOSPHATE 1 ML: 6.25; 1 SOLUTION ORAL at 11:37

## 2018-12-23 RX ADMIN — CEFTRIAXONE 1 MLS/HR: 1 INJECTION, SOLUTION INTRAVENOUS at 16:39

## 2018-12-23 RX ADMIN — GUAIFENESIN 1 MG: 100 SOLUTION ORAL at 08:16

## 2018-12-23 RX ADMIN — AZITHROMYCIN MONOHYDRATE 1 MLS/HR: 500 INJECTION, POWDER, LYOPHILIZED, FOR SOLUTION INTRAVENOUS at 17:36

## 2018-12-24 PROCEDURE — 3E0234Z INTRODUCTION OF SERUM, TOXOID AND VACCINE INTO MUSCLE, PERCUTANEOUS APPROACH: ICD-10-PCS

## 2018-12-24 RX ADMIN — ENOXAPARIN SODIUM 1 MG: 100 INJECTION SUBCUTANEOUS at 08:34

## 2018-12-24 RX ADMIN — CEFTRIAXONE 1 MLS/HR: 1 INJECTION, SOLUTION INTRAVENOUS at 17:22

## 2018-12-24 RX ADMIN — AZITHROMYCIN MONOHYDRATE 1 MLS/HR: 500 INJECTION, POWDER, LYOPHILIZED, FOR SOLUTION INTRAVENOUS at 17:24

## 2018-12-24 RX ADMIN — INFLUENZA A VIRUS A/MICHIGAN/45/2015 X-275 (H1N1) ANTIGEN (FORMALDEHYDE INACTIVATED), INFLUENZA A VIRUS A/HONG KONG/4801/2014 X-263B (H3N2) ANTIGEN (FORMALDEHYDE INACTIVATED), INFLUENZA B VIRUS B/PHUKET/3073/2013 ANTIGEN (FORMALDEHYDE INACTIVATED), AND INFLUENZA B VIRUS B/BRISBANE/60/2008 ANTIGEN (FORMALDEHYDE INACTIVATED) 1 ML: 15; 15; 15; 15 INJECTION, SUSPENSION INTRAMUSCULAR at 08:42

## 2018-12-24 RX ADMIN — PROMETHAZINE HYDROCHLORIDE AND CODEINE PHOSPHATE 1 ML: 6.25; 1 SOLUTION ORAL at 15:28

## 2018-12-24 RX ADMIN — THIAMINE HYDROCHLORIDE 1 MLS/HR: 100 INJECTION, SOLUTION INTRAMUSCULAR; INTRAVENOUS at 16:20

## 2019-01-04 ENCOUNTER — HOSPITAL ENCOUNTER (EMERGENCY)
Dept: HOSPITAL 91 - E/R | Age: 84
Discharge: HOME | End: 2019-01-04
Payer: MEDICARE

## 2019-01-04 ENCOUNTER — HOSPITAL ENCOUNTER (EMERGENCY)
Dept: HOSPITAL 10 - E/R | Age: 84
Discharge: HOME | End: 2019-01-04
Payer: MEDICARE

## 2019-01-04 VITALS — RESPIRATION RATE: 16 BRPM | SYSTOLIC BLOOD PRESSURE: 107 MMHG | DIASTOLIC BLOOD PRESSURE: 72 MMHG | HEART RATE: 82 BPM

## 2019-01-04 VITALS — WEIGHT: 115.3 LBS | HEIGHT: 55 IN | BODY MASS INDEX: 26.68 KG/M2

## 2019-01-04 DIAGNOSIS — J06.9: Primary | ICD-10-CM

## 2019-01-04 DIAGNOSIS — R40.2252: ICD-10-CM

## 2019-01-04 DIAGNOSIS — J40: ICD-10-CM

## 2019-01-04 DIAGNOSIS — R40.2142: ICD-10-CM

## 2019-01-04 DIAGNOSIS — R40.2362: ICD-10-CM

## 2019-01-04 PROCEDURE — 94664 DEMO&/EVAL PT USE INHALER: CPT

## 2019-01-04 PROCEDURE — 87400 INFLUENZA A/B EACH AG IA: CPT

## 2019-01-04 PROCEDURE — 99284 EMERGENCY DEPT VISIT MOD MDM: CPT

## 2019-01-04 PROCEDURE — 71045 X-RAY EXAM CHEST 1 VIEW: CPT

## 2019-01-04 RX ADMIN — ALBUTEROL SULFATE 1 MG: 2.5 SOLUTION RESPIRATORY (INHALATION) at 05:06

## 2019-01-04 NOTE — ERD
ER Documentation


Chief Complaint


Chief Complaint





cough/congestion/headache x3 days





HPI


This is a 83-year-old female who is here for cough.  The patient has had a cough


for 2 days with runny nose, nasal congestion, excessive sneezing.  She has not 


had any fever no nausea vomiting diarrhea no chest pain or shortness of breath. 


The patient has had a recent admission to the hospital here a few weeks ago for 


pneumonia.  She says she got better once discharged and yet has had this 


respiratory illness start again the past 2 days.





ROS


All systems reviewed and are negative except as per history of present illness.





Medications


Home Meds


Active Scripts


Budesonide-Formoterol Fumarate* (Symbicort*) 160-4.5 Hfa.aer.ad, 2 PUFF 


INHALATION BID, #1 EACH


   Prov:PHOENIX TINOCO MD         12/24/18


Azithromycin* (Zithromax* Tri-Jose) 500 Mg Tablet, 500 MG PO DAILY, #1 PACKET


   Prov:JAI MARTÍNEZ         12/23/18


Reported Medications


Albuterol Sulfate* (Ventolin HFA*) 18 Gm Hfa.aer.ad, 2 PUFF INHALATION Q4H PRN 


for WHEEZING AND SOB, #1 INHALER


   12/21/18





Allergies


Allergies:  


Coded Allergies:  


     vancomycin (Verified  Adverse Reaction, Intermediate, 12/21/18)





PMhx/Soc


History of Surgery:  No


Hx Neurological Disorder:  No


Hx Respiratory Disorders:  No


Hx Cardiac Disorders:  No


Hx Psychiatric Problems:  No


Hx Miscellaneous Medical Probl:  No


Hx Alcohol Use:  No


Hx Substance Use:  No


Hx Tobacco Use:  No





FmHx


Family History:  No coronary disease





Physical Exam


Vitals





Vital Signs


  Date      Temp   Pulse  Resp  B/P (MAP)   Pulse Ox  O2         O2 Flow    FiO2


Time                                                  Delivery   Rate


    1/4/19            77    18                    97                          21


     05:06


    1/4/19   98.2     80    16      130/60        96  Room Air


     05:00                            (83)


    1/4/19  100.3     84    16      130/60        96


     03:57                            (83)





Physical Exam


C const:      Well-developed, well-nourished


 Head:        Atraumatic, normocephalic


 Eyes:       Normal Conjunctiva, PERRLA, EOMI, normal sclera, no nystagmus


 ENT:         Normal External Ears, excessive clear nasal drainage, moist mucus 


membranes.


 Neck:        Full range of motion.  No meningismus, no lymphadenopathy.


 Resp:         Clear to auscultation bilaterally, no wheezing, rhonchi, rales


 Cardio:       Regular rate and rhythm, no murmurs, S1 S2 present


 Abd:         Soft, non tender x 4, non distended. Normal bowel sounds, no 


guarding or rebound, no pulsitile abdominal masses or bruits


 Skin:         No petechiae or rashes, no ecchymosis , no maculopapular rash


 Back:        No midline or flank tenderness


 Ext:          No cyanosis, or edema, FROM x 4, normal inspection, 


neurovascularly intact x 4


 Neur:        Awake and alert, STR 5/5 x 4, sensation intact x 4, no focal 


findings, cerebellum intact


 Psych:        Normal Mood and Affect


Results 24 hrs





Current Medications


 Medications
   Dose
          Sig/Jassi
       Start Time
   Status  Last


 (Trade)       Ordered        Route
 PRN     Stop Time              Admin
Dose


                              Reason                                Admin


 Albuterol
     7.5 mg         ONCE  STAT
    1/4/19        DC            1/4/19


(Proventil
                   NEB
           04:48
 1/4/19                05:06



0.083% (Neb))                                04:49








Procedures/MDM


Ordering MD: EYAL VILLATORO DO   Location:  E/R   Room/Bed:                


                           


                                        


PROCEDURE:   XR Chest. 


 


CLINICAL INDICATION:  Shortness of breath


 


TECHNIQUE:   Portable single view of the chest


 


COMPARISON:   CR CHEST 2/28/2017; CR CHEST 3/12/2016 


 


FINDINGS:


Lung volumes are reduced compared with prior. Ectatic and atherosclerotic aorta 


again seen. Mild interstitial prominence of the lungs but no definite acute 


infiltrate, pleural effusion, or overt congestive heart failure. Degenerative 


change of the spine and shoulders.. 


 


IMPRESSION:


Shallower lung inflation. Otherwise no interval change.. 


 


RPTAT: HLBE


_____________________________________________ 


Physician Marnie           Date    Time 


Electronically viewed and signed by Jeni Cordova Physician on 01/04/2019 


05:45 


 


D:  01/04/2019 05:45  T:  01/04/2019 05:45


LE/





CC: EYAL VILLATORO DO





[phone]














Influenza is negative.





Chest x-ray is unremarkable for pneumonia.





The patient's symptoms are more consistent with bronchitis/URI/viral illness.





Will treat accordingly.  She is feeling better after breathing treatment.





Patient feels much better at this time, and vital signs are normal, symptoms 


have improved. I did give strict instructions to return to the ED if symptoms 


continue or worsen, patient will otherwise follow-up with primary care 


physician. Patient understood instructions and agreed to plan.





Disclaimer: Inadvertent spelling and grammatical errors are likely due to 


EHR/dictation software use and do not reflect on the overall quality of patient 


care. Also, please note that the electronic time recorded on this note does not 


necessarily reflect the actual time of the patient encounter.





Departure


Diagnosis:  


   Primary Impression:  


   URI (upper respiratory infection)


   URI type:  unspecified URI  Qualified Codes:  J06.9 - Acute upper respiratory


   infection, unspecified


   Additional Impression:  


   Bronchitis


Condition:  Stable











EYAL VILLATORO DO          Jan 4, 2019 04:53